# Patient Record
Sex: FEMALE | Race: BLACK OR AFRICAN AMERICAN | NOT HISPANIC OR LATINO | Employment: FULL TIME | URBAN - METROPOLITAN AREA
[De-identification: names, ages, dates, MRNs, and addresses within clinical notes are randomized per-mention and may not be internally consistent; named-entity substitution may affect disease eponyms.]

---

## 2017-05-29 ENCOUNTER — HOSPITAL ENCOUNTER (EMERGENCY)
Facility: HOSPITAL | Age: 32
Discharge: HOME | End: 2017-05-29
Payer: COMMERCIAL

## 2017-05-29 VITALS
WEIGHT: 125 LBS | BODY MASS INDEX: 23.6 KG/M2 | DIASTOLIC BLOOD PRESSURE: 76 MMHG | TEMPERATURE: 99 F | RESPIRATION RATE: 18 BRPM | HEART RATE: 79 BPM | HEIGHT: 61 IN | OXYGEN SATURATION: 100 % | SYSTOLIC BLOOD PRESSURE: 119 MMHG

## 2017-05-29 DIAGNOSIS — S99.921A INJURY OF RIGHT GREAT TOE, INITIAL ENCOUNTER: Primary | ICD-10-CM

## 2017-05-29 PROCEDURE — 25000003 PHARM REV CODE 250: Performed by: PHYSICIAN ASSISTANT

## 2017-05-29 PROCEDURE — 11730 AVULSION NAIL PLATE SIMPLE 1: CPT | Mod: T5

## 2017-05-29 RX ORDER — LIDOCAINE HYDROCHLORIDE 10 MG/ML
10 INJECTION INFILTRATION; PERINEURAL
Status: COMPLETED | OUTPATIENT
Start: 2017-05-29 | End: 2017-05-29

## 2017-05-29 RX ADMIN — LIDOCAINE HYDROCHLORIDE 10 ML: 10 INJECTION, SOLUTION INFILTRATION; PERINEURAL at 05:05

## 2017-05-29 NOTE — ED TRIAGE NOTES
Pt presents to ER due to R big toe pain. Pt states her foot was stepped on and the nail came off.

## 2017-05-29 NOTE — ED PROVIDER NOTES
"Encounter Date: 5/29/2017       History     Chief Complaint   Patient presents with    Nail Problem     Pt reports just PTA "someone stepped on my right foot and my great toe nail lifted all the way off". Rates pain 10/10     Review of patient's allergies indicates:  Allergies not on file  HPI  No past medical history on file.  No past surgical history on file.  No family history on file.  Social History   Substance Use Topics    Smoking status: Not on file    Smokeless tobacco: Not on file    Alcohol use Not on file     Review of Systems    Physical Exam     Initial Vitals [05/29/17 0249]   BP Pulse Resp Temp SpO2   119/76 79 18 98.6 °F (37 °C) 100 %     Physical Exam    ED Course   Procedures  Labs Reviewed   POCT URINE PREGNANCY             Medical Decision Making:   Initial Assessment:   Pt is a 31-year-old female who presents for evaluation of right great toe injury.                    ED Course     Clinical Impression:   {Add your Clinical Impression here. If you haven't documented one yet, please pend the note, finalize a Clinical Impression, and refresh your note before signing.:[id number]}       "

## 2017-05-29 NOTE — DISCHARGE INSTRUCTIONS
You can take Ibuprofen over the counter as needed for pain and swelling. Applying ice for 20 minutes every 3 hours can also help within the first 2 days. Please keep it covered and dry for the first week.     Please make follow up appointment with podiatry for further evaluation and management. Please also make an appointment to establish primary care at one of the provided clinics.     Return to the ER if you develop fever, worsening pain, redness or swelling, if begins to drain pus or bad smelling drainage or as needed.

## 2017-05-29 NOTE — ED PROVIDER NOTES
"Encounter Date: 5/29/2017    SCRIBE #1 NOTE: I, Brooke Banegas, am scribing for, and in the presence of,  Charlotte Kumar PA-C. I have scribed the following portions of the note - Other sections scribed: HPI/ROS.       History     Chief Complaint   Patient presents with    Nail Problem     Pt reports just PTA "someone stepped on my right foot and my great toe nail lifted all the way off". Rates pain 10/10     Review of patient's allergies indicates:  Allergies not on file  CC: Nail Problem    HPI: This 31 y.o. female with no pertinent PMHx presents to the ED c/o her great right toe lifting off after someone accidentally stepped on it just prior to arrival.  The patient reports the pain is exacerbated when she tries to walk.  She describes it as a "shooting" pain and 10/10.  No treatment was attempted prior to arrival.  The patient denies fever, emesis or any other associated symptoms.         The history is provided by the patient.     History reviewed. No pertinent past medical history.  History reviewed. No pertinent surgical history.  History reviewed. No pertinent family history.  Social History   Substance Use Topics    Smoking status: Never Smoker    Smokeless tobacco: Not on file    Alcohol use No     Review of Systems   Constitutional: Negative for fever.   Gastrointestinal: Negative for nausea and vomiting.   Musculoskeletal: Negative for back pain.   Skin: Positive for wound (Great right toe nail lifting off).       Physical Exam     Initial Vitals [05/29/17 0249]   BP Pulse Resp Temp SpO2   119/76 79 18 98.6 °F (37 °C) 100 %     Physical Exam    Constitutional: She appears well-developed and well-nourished. No distress.   HENT:   Head: Normocephalic.   Right Ear: External ear normal.   Left Ear: External ear normal.   Eyes: Conjunctivae are normal.   Cardiovascular: Normal rate and regular rhythm. Exam reveals no gallop and no friction rub.    No murmur heard.  Pulmonary/Chest: Breath sounds normal. " "No respiratory distress. She has no wheezes. She has no rhonchi. She has no rales.   Musculoskeletal:   Pt has TTP of IP joint of R great toe. Pt is able to flex and extend IP. Mild swelling to distal R great toe.    Neurological: She is alert. No sensory deficit.   Skin: Skin is warm and dry.   Partial avulsion of right great toenail. Nail bed intact.    Psychiatric: She has a normal mood and affect.         ED Course   Nail Removal  Date/Time: 5/30/2017 10:34 PM  Performed by: JED FLROES  Authorized by: ROSA ZAMARRIPA   Consent Done: Yes  Consent: Verbal consent obtained.  Anesthesia: local infiltration    Anesthesia:  Anesthesia: local infiltration  Local Anesthetic: lidocaine 1% without epinephrine   Anesthetic total: 8 mL  Preparation: skin prepped with Betadine  Amount removed: complete  Patient tolerance: Patient tolerated the procedure well with no immediate complications  Comments: Right great toe was cleaned with saline solution and hydrogen peroxide. Skin was prepped with Betadine and digital block was performed. Pt initially still reported pain while I attempted to remove the nail. I left the room to give the lidocaine time to take effect and when I returned the pt stated "it became totally numb so I just pulled it off." Nail was completely removed. Cleaned with saline. Covered with bandage.         Labs Reviewed - No data to display          Medical Decision Making:   Initial Assessment:   Pt is a 30 y/o female who presents for evaluation of right great toe injury after someone stepped on her toe. Pt is afebrile in NAD. On exam, there is partial avulsion of R great toenail at the lateral aspect. Nail bed intact. No active bleeding. There is TTP of IP joint with mild swelling of distal toe. I considered fracture or dislocation. X-ray negative for fracture, dislocations or acute abnormalities. Digital block and attempted removal was performed per procedure note. Pt removed the remainder of the " toenail on her own after I had stepped out to give the digital block time to take effect. Pt was discharged to home with instructions to keep the area clean, dry and covered with bandage. I instructed her to ice the area and take ibuprofen or tylenol OTC as needed for pain. Podiatry follow up in 2 days. Return to ER if symptoms worsen or as needed.    I discussed this pt with Dr. Paulino who agrees with assessment and plan.             Scribe Attestation:   Scribe #1: I performed the above scribed service and the documentation accurately describes the services I performed. I attest to the accuracy of the note.    Attending Attestation:           Physician Attestation for Scribe:  Physician Attestation Statement for Scribe #1: I, Charlotte Kumar PA-C, reviewed documentation, as scribed by Brooke Banegas in my presence, and it is both accurate and complete.                 ED Course     Clinical Impression:   The encounter diagnosis was Injury of right great toe, initial encounter.          Charlotte Kumar PA-C  05/30/17 [id number]

## 2017-05-31 ENCOUNTER — OFFICE VISIT (OUTPATIENT)
Dept: PODIATRY | Facility: CLINIC | Age: 32
End: 2017-05-31
Payer: COMMERCIAL

## 2017-05-31 VITALS
SYSTOLIC BLOOD PRESSURE: 108 MMHG | HEIGHT: 61 IN | HEART RATE: 73 BPM | WEIGHT: 125 LBS | BODY MASS INDEX: 23.6 KG/M2 | DIASTOLIC BLOOD PRESSURE: 77 MMHG

## 2017-05-31 DIAGNOSIS — L60.0 INGROWN NAIL: Primary | ICD-10-CM

## 2017-05-31 PROCEDURE — 99203 OFFICE O/P NEW LOW 30 MIN: CPT | Mod: S$GLB,,, | Performed by: PODIATRIST

## 2017-05-31 PROCEDURE — 99999 PR PBB SHADOW E&M-EST. PATIENT-LVL III: CPT | Mod: PBBFAC,,, | Performed by: PODIATRIST

## 2017-05-31 NOTE — PATIENT INSTRUCTIONS
Instructions for Care after Ingrown Nail removal    Dressing Options- Traditional Method:  1. Soaking two times a day in WARM water with Epsom salts or diluted Povidone Iodine  (Betadine) for 15-20 minutes. You will need to purchase these products from the pharmacy.    2. Dry toe then apply an antibiotic cream or ointment such as Neosporin or Polysporin plus or  Garamycin and cover with a 2 x 2 inch size gauze and then secure with a 1 inch band aid.    3. In the second week, take the dressing off at bedtime to air dry the toe.        Understanding Ingrown Toenails    An ingrown nail is the result of a nail growing into the skin that surrounds it. This often occurs at either edge of the big toe. Ingrown nails may be caused by improper trimming, inherited nail deformities, injuries, fungal infections, or pressure.  Symptoms  Ingrown nails may cause pain at the tip of the toe or all the way to the base of the toe. The pain is often worse while walking. An ingrown nail may also lead to infection, inflammation, or a more serious condition. If its infected, you might see pus or redness.  Evaluation  To determine the extent of your problem, your healthcare provider examines and possibly presses the painful area. If other problems are suspected, blood tests, cultures, and X-rays may be done as well.  Treatment  If the nail isnt infected, your healthcare provider may trim the corner of it to help relieve your symptoms. He or she may need to remove one side of your nail back to the cuticle. The base of the nail may then be treated with a chemical to keep the ingrown part from growing back. Severe infections or ingrown nails may require antibiotics and temporary or permanent removal of a portion of the nail. To prevent pain, a local anesthetic may be used in these procedures. This treatment is usually done at your healthcare provider's office.  Prevention  Many nail problems can be prevented by wearing the right shoes and  trimming your nails properly. To help avoid infection, keep your feet clean and dry. If you have diabetes, talk with your healthcare provider before doing any foot self-care.  · The right shoes: Get your feet measured (your size may change as you age). Wear shoes that are supportive and roomy enough for your toes to wiggle. Look for shoes made of natural materials such as leather, which allow your feet to breathe.  · Proper trimming: To avoid problems, trim your toenails straight across without cutting down into the corners. If you cant trim your own nails, ask your healthcare provider to do so for you.  Date Last Reviewed: 10/1/2016  © 9885-5137 [employer]. [address] [place] [address], [place] [address]. All rights reserved. This information is not intended as a substitute for professional medical care. Always follow your healthcare professional's instructions.

## 2017-05-31 NOTE — PROGRESS NOTES
"Subjective:    Patient ID: Philomena Hanson is a 32 y.o. female.    Chief Complaint: Toe Pain (right great toe)      HPI:   Philomena is a 32 y.o. female who presents to the clinic complaining of painful ingrown toenail on the right foot. Reports her  stepped on the toe and toenail partially came off. She went to the ER where the nail was fully removed. XR were negative for fx.     HPI    Last Podiatry Enc: Visit date not found  Last Enc w/ Me: Visit date not found    History reviewed. No pertinent past medical history.  History reviewed. No pertinent surgical history.  Social History     Social History    Marital status:      Spouse name: N/A    Number of children: N/A    Years of education: N/A     Occupational History    Not on file.     Social History Main Topics    Smoking status: Never Smoker    Smokeless tobacco: Not on file    Alcohol use No    Drug use: Unknown    Sexual activity: Not on file     Other Topics Concern    Not on file     Social History Narrative    No narrative on file       No current outpatient prescriptions on file.  Review of patient's allergies indicates:  No Known Allergies    ROS  ROS Constitutional:  General Appearance: well nourished  Vascular: negative for cramps, edema and bruising  Musculoskeletal: negative for joint paint and joint edema  Skin: negative for rashes and lesions  Neurological: negative for burning, tingling and numbness  Gastrointestinal: negative for stomach pain, nausea and vomiting        Objective:        /77   Pulse 73   Ht 5' 1" (1.549 m)   Wt 56.7 kg (125 lb)   BMI 23.62 kg/m²     Ortho/SPM Exam  Physical Exam  LE exam con't:  V: DP 2/4, PT 2/4, CRT< 3s to all digits tested.    N: SILT in SP/DP/T/Bre/Saph distributions.    Derm: Skin intact. No erythema, edema or ecchymosis. R hallux w/o nail plate. Open, exposed nail bed, no signs on infection.     Ortho:  +Motor EHL/FHL/TA/GA   Compartments soft/compressible. No pain on " passive stretch of big toe. No calf  pain.        Assessment:     Imaging / Labs:        1. Ingrown nail - Right Foot          Plan:          Jamaal Quach was seen today for toe pain.    Diagnoses and all orders for this visit:    Ingrown nail - Right Foot        Philomena Hanson is a 32 y.o. female presenting w/   1. Ingrown nail - Right Foot        -pt seen, evaluated, and managed  -dx discussed in detail. All questions/concerns addressed  -all tx options discussed. All alternatives, risks, benefits of all txs discussed  -The patient was educated regarding the above diagnosis.  -rxs dispensed: none  -XR reviewd: no fx  -pt educated that if matrix was damaged during injury, that nail may grown back abnormal  -pt to perform epsom salt / betadine soaks x 2 wks  -keep covered with triple abx ointment+bandaid x 2wks  -instructions for home care adminsitered    rtc prn      Return in about 4 weeks (around 6/28/2017).

## 2021-03-30 ENCOUNTER — IMMUNIZATION (OUTPATIENT)
Dept: PRIMARY CARE CLINIC | Facility: CLINIC | Age: 36
End: 2021-03-30
Payer: COMMERCIAL

## 2021-03-30 DIAGNOSIS — Z23 NEED FOR VACCINATION: Primary | ICD-10-CM

## 2021-03-30 PROCEDURE — 0011A PR IMMUNIZ ADMIN, SARS-COV-2 COVID-19 VACC, 100MCG/0.5ML, 1ST DOSE: CPT | Mod: CV19,S$GLB,, | Performed by: INTERNAL MEDICINE

## 2021-03-30 PROCEDURE — 0011A PR IMMUNIZ ADMIN, SARS-COV-2 COVID-19 VACC, 100MCG/0.5ML, 1ST DOSE: ICD-10-PCS | Mod: CV19,S$GLB,, | Performed by: INTERNAL MEDICINE

## 2021-03-30 PROCEDURE — 91301 PR SARS-COV-2 COVID-19 VACCINE, NO PRSV, 100MCG/0.5ML, IM: ICD-10-PCS | Mod: S$GLB,,, | Performed by: INTERNAL MEDICINE

## 2021-03-30 PROCEDURE — 91301 PR SARS-COV-2 COVID-19 VACCINE, NO PRSV, 100MCG/0.5ML, IM: CPT | Mod: S$GLB,,, | Performed by: INTERNAL MEDICINE

## 2021-03-30 RX ADMIN — Medication 0.5 ML: at 09:03

## 2021-04-28 ENCOUNTER — IMMUNIZATION (OUTPATIENT)
Dept: PRIMARY CARE CLINIC | Facility: CLINIC | Age: 36
End: 2021-04-28

## 2021-04-28 DIAGNOSIS — Z23 NEED FOR VACCINATION: Primary | ICD-10-CM

## 2021-04-28 PROCEDURE — 0012A PR IMMUNIZ ADMIN, SARS-COV-2 COVID-19 VACC, 100MCG/0.5ML, 2ND DOSE: ICD-10-PCS | Mod: CV19,S$GLB,, | Performed by: INTERNAL MEDICINE

## 2021-04-28 PROCEDURE — 0012A PR IMMUNIZ ADMIN, SARS-COV-2 COVID-19 VACC, 100MCG/0.5ML, 2ND DOSE: CPT | Mod: CV19,S$GLB,, | Performed by: INTERNAL MEDICINE

## 2021-04-28 PROCEDURE — 91301 PR SARS-COV-2 COVID-19 VACCINE, NO PRSV, 100MCG/0.5ML, IM: CPT | Mod: S$GLB,,, | Performed by: INTERNAL MEDICINE

## 2021-04-28 PROCEDURE — 91301 PR SARS-COV-2 COVID-19 VACCINE, NO PRSV, 100MCG/0.5ML, IM: ICD-10-PCS | Mod: S$GLB,,, | Performed by: INTERNAL MEDICINE

## 2021-04-28 RX ADMIN — Medication 0.5 ML: at 09:04
